# Patient Record
Sex: FEMALE | Race: WHITE
[De-identification: names, ages, dates, MRNs, and addresses within clinical notes are randomized per-mention and may not be internally consistent; named-entity substitution may affect disease eponyms.]

---

## 2017-08-26 ENCOUNTER — HOSPITAL ENCOUNTER (EMERGENCY)
Dept: HOSPITAL 17 - PHED | Age: 82
Discharge: HOME | End: 2017-08-26
Payer: MEDICARE

## 2017-08-26 VITALS — HEART RATE: 60 BPM | DIASTOLIC BLOOD PRESSURE: 86 MMHG | SYSTOLIC BLOOD PRESSURE: 183 MMHG

## 2017-08-26 VITALS — DIASTOLIC BLOOD PRESSURE: 77 MMHG | HEART RATE: 73 BPM | SYSTOLIC BLOOD PRESSURE: 197 MMHG | OXYGEN SATURATION: 96 %

## 2017-08-26 VITALS — WEIGHT: 252.87 LBS | HEIGHT: 64 IN | BODY MASS INDEX: 43.17 KG/M2

## 2017-08-26 VITALS
SYSTOLIC BLOOD PRESSURE: 188 MMHG | TEMPERATURE: 96 F | HEART RATE: 68 BPM | DIASTOLIC BLOOD PRESSURE: 81 MMHG | RESPIRATION RATE: 20 BRPM | OXYGEN SATURATION: 98 %
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
## 2017-08-26 DIAGNOSIS — T14.8: Primary | ICD-10-CM
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
## 2017-08-26 PROCEDURE — 72170 X-RAY EXAM OF PELVIS: CPT

## 2017-08-26 PROCEDURE — 99285 EMERGENCY DEPT VISIT HI MDM: CPT

## 2017-08-26 PROCEDURE — 70450 CT HEAD/BRAIN W/O DYE: CPT

## 2017-08-26 PROCEDURE — 72131 CT LUMBAR SPINE W/O DYE: CPT

## 2017-08-26 NOTE — RADRPT
EXAM DATE/TIME:  08/26/2017 19:40 

 

[facility] COMPARISON:     

No previous studies available for comparison.

 

 

INDICATIONS :     

Status post fall. Hit back of head.

                      

 

RADIATION DOSE:     

63.36 CTDIvol (mGy) 

 

 

 

MEDICAL HISTORY :     

Hypertension.  

 

SURGICAL HISTORY :       

cataract

 

ENCOUNTER:      

Initial

 

ACUITY:      

1 day

 

PAIN SCALE:      

0/10

 

LOCATION:       

Cranial 

 

TECHNIQUE:     

Multiple contiguous axial images were obtained of the head.  Using automated exposure control and adj
ustment of the mA and/or kV according to patient size, radiation dose was kept as low as reasonably a
chievable to obtain optimal diagnostic quality images.   DICOM format image data is available electro
nically for review and comparison.  

 

FINDINGS:     

There is central cerebral atrophy.  There is no acute infarct, acute hemorrhage, mass effect or extra
-axial fluid

 collections.  No skull fracture is noted.  

 

CONCLUSION:     

1.  Central cerebral atrophy.

2.  No acute infarct, acute hemorrhage, mass effect or extra-axial fluid collections.  

 

 

 Duke Sanches MD on August 26, 2017 at 19:49                

Board Certified Radiologist.

 This report was verified electronically.

## 2017-08-26 NOTE — PD
HPI


Chief Complaint:  Fall


Time Seen by Provider:  19:27


Travel History


International Travel<30 days:  No


Contact w/Intl Traveler<30days:  No


Traveled to known affect area:  No





History of Present Illness


HPI


The patient is an 85-year-old female that was cleaning the windows of a [employer] 

when she lost balance and fell.  There was no loss of consciousness, she did 

hit her mid occipital area of the scalp.  Her main complaint is pain in the low 

back, coccygeal area and bilateral pelvis.  She is able to walk around but with 

pain and the pain gradually increased to where she finally came in.  She did 

have some nausea following the fall but no vomiting.  She denies any numbness, 

weakness or radiation of pain down her legs.  She is on Coumadin for atrial 

fibrillation.  She denies any headache.





PFSH


Past Medical History


Hx Anticoagulant Therapy:  Yes (COUMADIN)


Arthritis:  Yes


Blood Disorders:  No


Heart Rhythm Problems:  Yes (A FIB)


Cancer:  No


Cardiovascular Problems:  Yes (htn on meds, a-fib)


Congestive Heart Failure:  Yes


Endocrine:  No


Genitourinary:  No


Hepatitis:  No


Hypertension:  Yes


Immune Disorder:  No


Kidney Stones:  No


Musculoskeletal:  No


Neurologic:  No


Psychiatric:  No


Reproductive:  No


Respiratory:  No


Immunizations Current:  Yes (SHINGLES: 2014)


Renal Failure:  No


Influenza Vaccination:  Yes


Pregnant?:  Not Pregnant


:  5


Para:  5





Past Surgical History


Abdominal Surgery:  Yes


AICD:  No


Appendectomy:  Yes


Arteriovenous Shunt:  No


Cardiac Surgery:  No


Cholecystectomy:  Yes


Ear Surgery:  No


Endocrine Surgery:  No


Eye Surgery:  Yes (CATARACTS, RETINAL TEAR REPAIRS BILATERAL)


Genitourinary Surgery:  No


Gynecologic Surgery:  Yes


Hysterectomy:  Yes


Insulin Pump:  No


Joint Replacement:  Yes (BILATERAL KNEES)


Oral Surgery:  No


Pacemaker:  No


Thoracic Surgery:  No


Other Surgery:  No





Social History


Alcohol Use:  No


Tobacco Use:  No (QUIT AGE 36)


Substance Use:  No





Allergies-Medications


(Allergen,Severity, Reaction):  


Coded Allergies:  


     adhesive (Unverified  Allergy, Intermediate, PT STATES NO ALLERGY TO THIS, 

17)


     latex (Unverified  Allergy, Intermediate, ITCHING/RASH, 17)


Reported Meds & Prescriptions





Reported Meds & Active Scripts


Active


Reported


Coumadin (Warfarin) 10 Mg Tab 8 Mg PO DAILY


Coumadin (Warfarin) 7.5 Mg Tab 7 Mg PO 


Meclizine (Meclizine HCl) 25 Mg Tab 25 Mg PO TID PRN


Coq-10 Tr (Coenzyme Q10 (Ubidecarenone)) 100 Mg Cap 200 Mg PO HS


Atenolol 50 Mg Tab 50 Mg PO DAILY








Review of Systems


Except as stated in HPI:  all other systems reviewed are Neg





Physical Exam


Narrative


GENERAL: The patient is obese, alert, oriented 3 and slight apparent distress 

with her low back pain.


SKIN: Focused skin assessment warm/dry.


HEAD: There is a contusion on the mid occipital area but no associated skull 

deformity.  Normocephalic.  Neither raccoon eyes nor wallace sign is present.


EYES: Pupils equal and round. No scleral icterus. No injection or drainage. 


ENT: No nasal bleeding or discharge.  Mucous membranes pink and moist.  There 

is no hemotympanum present.


NECK: Trachea midline. No JVD. 


CARDIOVASCULAR: Regular rate and rhythm.  No murmur appreciated.


RESPIRATORY: No accessory muscle use. Clear to auscultation. Breath sounds 

equal bilaterally. 


GASTROINTESTINAL: Abdomen soft, non-tender, nondistended. Hepatic and splenic 

margins not palpable. 


MUSCULOSKELETAL: No obvious deformities. No clubbing.  No cyanosis.  No edema.  

Straight leg raising is normal, deep tendon reflexes are 0 bilaterally both 

patella and Achilles and pinprick is normal.  There is tenderness diffusely in 

the low back and over the coccygeal area.  No obvious bony deformity is 

present.  There is diffuse tenderness over both sides of the pelvis laterally.


NEUROLOGICAL: Awake and alert. No obvious cranial nerve deficits.  Motor 

grossly within normal limits. Normal speech.


PSYCHIATRIC: Appropriate mood and affect; insight and judgment normal.





Data


Data


Last Documented VS





Vital Signs








  Date Time  Temp Pulse Resp B/P (MAP) Pulse Ox O2 Delivery O2 Flow Rate FiO2


 


17 20:41  60  183/86 (118)    


 


17 19:17     96 Room Air  


 


17 18:56 96.0  20     








Orders





 Orders


Ct Brain W/O Iv Contrast(Rout) (17 19:27)


Ct Lumb Spine W/O Contrast (17 19:27)


Pelvis, Ap Only (Routine) (17 19:30)








MDM


Medical Decision Making


Medical Screen Exam Complete:  Yes


Emergency Medical Condition:  Yes


Medical Record Reviewed:  Yes


Interpretation(s)


The CT brain shows central cerebral atrophy and no acute infarct, hemorrhage or 

mass effect noted.  The pelvis shows no acute fracture dislocation, mild 

degenerative changes involving the lumbar spine, bilateral hips and symphysis 

pubis are noted.The CT of the lumbar spine shows severe spinal stenosis and 

bilateral foraminal narrowing at L4 5 with moderate spinal stenosis and 

bilateral foraminal narrowing at L3-L4 and mild spinal stenosis with bilateral 

foraminal narrowing at L2-L3 and minimal disc bulge at L1-L2 and mild bilateral 

foraminal narrowing at L5-S1.  There is fitted set joint hypertrophy 

bilaterally L1 through L5 and grade 1 anterior listhesis of L4 on L5.  No 

fractures noted.


Differential Diagnosis


Compression fracture lumbar spine, fractured pelvis, fractured skull, 

intracranial bleed, scalp contusion, acute lumbar strain, multiple contusions


Narrative Course


The patient has significant foraminal narrowing of the lumbosacral spine on CT.

  She says she has a walker at home and can ambulate well with this.  She will 

follow up with her primary care physician next week.





Diagnosis





 Primary Impression:  


 Multiple contusions


 Additional Impression:  


 Foraminal stenosis of lumbar region





***Additional Instructions:  


As we discussed, follow-up with your primary care physician next week.  Use 

your walker at home for the next few days until these aches and pains subside.  

Do not drink alcohol or drive on the tramadol that I wrote.


***Med/Other Pt SpecificInfo:  Prescription(s) given


Scripts


Tramadol (Tramadol) 50 Mg Tab


50 MG PO Q6H Y for PAIN, #15 TAB 0 Refills


   Prov: Vern Ordonez MD         17


Disposition:  01 DISCHARGE HOME


Condition:  Stable











Vern Ordonez MD Aug 26, 2017 19:37

## 2017-08-26 NOTE — RADRPT
EXAM DATE/TIME:  08/26/2017 19:44 

 

[facility] COMPARISON:     

No previous studies available for comparison.

 

 

INDICATIONS :     

Patient fell and hurt tailbone.  Bilateral back and coccyx pain.

                      

 

RADIATION DOSE:     

40.36 CTDIvol (mGy) 

 

 

 

MEDICAL HISTORY :     

Congestive heart failure. Hypertension. 

 

SURGICAL HISTORY :      

Appendectomy. Cholecystectomy. Hysterectomy.

 

ENCOUNTER:      

Initial

 

ACUITY:      

1 day

 

PAIN SCALE:      

10/10

 

LOCATION:       

Bilateral buttock and lumbar

 

TECHNIQUE:     

Volumetric scanning of the lumbar spine was performed.  Multiplanar reconstructions in the sagittal, 
coronal and oblique axial planes were performed.  Using automated exposure control and adjustment of 
the mA and/or kV according to patient size, radiation dose was kept as low as reasonably achievable t
o obtain optimal diagnostic quality images.   DICOM format image data is available electronically for
 review and comparison.  

 

FINDINGS:       

There is grade I anterolisthesis of L4 in relation to L5.  Severe circumferential spinal stenosis and
 bilateral foraminal narrowing is noted at L4-L5.  There is no acute compression fracture of the lumb
ar spine.  Mild scoliosis of the lumbar spine is noted. 

 

T12-L1:  

There is no significant spinal stenosis, disc bulge or herniation.  The bilateral neural foramina are
 patent.  The facet joints and ligaments are unremarkable.

 

L1-L2:  

There is a minimal diffuse disc bulge as well as mild facet joint hypertrophy bilaterally resulting i
n no spinal stenosis or neural foraminal narrowing.  No focal disc herniation is noted. 

 

L2-L3:  

There is some mild spinal stenosis and bilateral foraminal narrowing secondary to a combination of di
ffuse disc bulge, facet joint hypertrophy and ligamentous laxity.  No focal disc herniation is noted.
 

 

L3-L4:  

There is moderate circumferential spinal stenosis and bilateral foraminal narrowing secondary to a co
mbination of diffuse disc bulge, facet joint hypertrophy and ligamentous laxity.  No focal disc herni
ation is noted.  

 

L4-L5:  

There is severe circumferential spinal stenosis and bilateral foraminal narrowing secondary to a comb
ination of diffuse disc bulge, facet joint hypertrophy and ligamentous laxity.  Vacuum facets are not
ed bilaterally.  There is grade I anterolisthesis of L4 in relation to L5.

 

L5-S1:  

There is no significant spinal stenosis.  Facet joint hypertrophy is noted bilaterally which results 
in mild bilateral foraminal narrowing.  No focal disc herniation is noted. 

 

CONCLUSION:     

1.  Severe spinal stenosis and bilateral foraminal narrowing at L4-L5. 

2.  Moderate spinal stenosis and bilateral foraminal narrowing at L3-L4.

3.  Mild spinal stenosis and bilateral foraminal narrowing at L2-L3. 

4.  Minimal diffuse disc bulge at L1-L2 resulting in no spinal stenosis or neural foraminal narrowing
.

5.  Mild bilateral foraminal narrowing at L5-S1 predominantly related to facet joint hypertrophy bila
terally.

6.  Facet joint hypertrophy bilaterally from L1 through S1. 

7.  Grade I anterolisthesis of L4 in relation to L5. 

 

 

 Duke Sanches MD on August 26, 2017 at 20:43                

Board Certified Radiologist.

 This report was verified electronically.

## 2017-08-26 NOTE — RADRPT
EXAM DATE/TIME:  08/26/2017 20:04 

 

[facility] COMPARISON:     

No previous studies available for comparison.

 

                     

INDICATIONS :     

Trauma. Fall.

                     

 

MEDICAL HISTORY :     

None.          

 

SURGICAL HISTORY :     

Hysterectomy. Cholecystectomy.  

 

ENCOUNTER:     

Initial                                        

 

ACUITY:     

1 day      

 

PAIN SCORE:     

8/10

 

LOCATION:     

Pelvis 

 

FINDINGS:     

There is no acute fracture or dislocation of the bony pelvis.  Mild degenerative changes are noted in
volving the lower lumbar spine and bilateral hips.  Mild degenerative changes are also noted involvin
g the symphysis pubis. 

 

CONCLUSION:     

1.  No acute fracture or dislocation.

2.  Mild degenerative changes involving the lumbar spine, bilateral hips and symphysis pubis.

 

 

 Duke Sanches MD on August 26, 2017 at 20:21                

Board Certified Radiologist.

 This report was verified electronically.

## 2018-01-24 ENCOUNTER — HOSPITAL ENCOUNTER (OUTPATIENT)
Dept: HOSPITAL 17 - PHED | Age: 83
Setting detail: OBSERVATION
LOS: 1 days | Discharge: HOME | End: 2018-01-25
Attending: FAMILY MEDICINE | Admitting: FAMILY MEDICINE
Payer: MEDICARE

## 2018-01-24 VITALS
TEMPERATURE: 97.5 F | SYSTOLIC BLOOD PRESSURE: 106 MMHG | OXYGEN SATURATION: 96 % | HEART RATE: 112 BPM | DIASTOLIC BLOOD PRESSURE: 80 MMHG | RESPIRATION RATE: 16 BRPM

## 2018-01-24 VITALS — BODY MASS INDEX: 43.51 KG/M2 | WEIGHT: 254.85 LBS | HEIGHT: 64 IN

## 2018-01-24 VITALS
DIASTOLIC BLOOD PRESSURE: 70 MMHG | HEART RATE: 94 BPM | RESPIRATION RATE: 16 BRPM | SYSTOLIC BLOOD PRESSURE: 129 MMHG | OXYGEN SATURATION: 100 %

## 2018-01-24 VITALS — SYSTOLIC BLOOD PRESSURE: 127 MMHG | DIASTOLIC BLOOD PRESSURE: 72 MMHG

## 2018-01-24 VITALS
SYSTOLIC BLOOD PRESSURE: 151 MMHG | TEMPERATURE: 96.6 F | RESPIRATION RATE: 20 BRPM | OXYGEN SATURATION: 97 % | DIASTOLIC BLOOD PRESSURE: 68 MMHG | HEART RATE: 72 BPM

## 2018-01-24 VITALS — HEART RATE: 87 BPM | DIASTOLIC BLOOD PRESSURE: 72 MMHG | SYSTOLIC BLOOD PRESSURE: 116 MMHG | RESPIRATION RATE: 16 BRPM

## 2018-01-24 VITALS — DIASTOLIC BLOOD PRESSURE: 75 MMHG | SYSTOLIC BLOOD PRESSURE: 102 MMHG | HEART RATE: 100 BPM | OXYGEN SATURATION: 97 %

## 2018-01-24 VITALS
HEART RATE: 87 BPM | RESPIRATION RATE: 16 BRPM | SYSTOLIC BLOOD PRESSURE: 162 MMHG | OXYGEN SATURATION: 98 % | DIASTOLIC BLOOD PRESSURE: 60 MMHG

## 2018-01-24 VITALS — HEART RATE: 67 BPM

## 2018-01-24 VITALS — OXYGEN SATURATION: 98 %

## 2018-01-24 DIAGNOSIS — I48.0: ICD-10-CM

## 2018-01-24 DIAGNOSIS — E78.5: ICD-10-CM

## 2018-01-24 DIAGNOSIS — N18.3: ICD-10-CM

## 2018-01-24 DIAGNOSIS — I50.32: ICD-10-CM

## 2018-01-24 DIAGNOSIS — R00.2: ICD-10-CM

## 2018-01-24 DIAGNOSIS — I13.0: Primary | ICD-10-CM

## 2018-01-24 LAB
ALBUMIN SERPL-MCNC: 3.5 GM/DL (ref 3.4–5)
ALP SERPL-CCNC: 60 U/L (ref 45–117)
ALT SERPL-CCNC: 23 U/L (ref 10–53)
AST SERPL-CCNC: 26 U/L (ref 15–37)
BASOPHILS # BLD AUTO: 0 TH/MM3 (ref 0–0.2)
BASOPHILS NFR BLD: 0.5 % (ref 0–2)
BILIRUB SERPL-MCNC: 0.3 MG/DL (ref 0.2–1)
BUN SERPL-MCNC: 30 MG/DL (ref 7–18)
CALCIUM SERPL-MCNC: 8.7 MG/DL (ref 8.5–10.1)
CHLORIDE SERPL-SCNC: 103 MEQ/L (ref 98–107)
CREAT SERPL-MCNC: 1.4 MG/DL (ref 0.5–1)
EOSINOPHIL # BLD: 0.1 TH/MM3 (ref 0–0.4)
EOSINOPHIL NFR BLD: 1.2 % (ref 0–4)
ERYTHROCYTE [DISTWIDTH] IN BLOOD BY AUTOMATED COUNT: 13 % (ref 11.6–17.2)
GFR SERPLBLD BASED ON 1.73 SQ M-ARVRAT: 36 ML/MIN (ref 89–?)
GLUCOSE SERPL-MCNC: 101 MG/DL (ref 74–106)
HCO3 BLD-SCNC: 30 MEQ/L (ref 21–32)
HCT VFR BLD CALC: 42.5 % (ref 35–46)
HGB BLD-MCNC: 13.9 GM/DL (ref 11.6–15.3)
INR PPP: 1.7 RATIO
LYMPHOCYTES # BLD AUTO: 1.7 TH/MM3 (ref 1–4.8)
LYMPHOCYTES NFR BLD AUTO: 29.1 % (ref 9–44)
MAGNESIUM SERPL-MCNC: 2 MG/DL (ref 1.5–2.5)
MCH RBC QN AUTO: 30.7 PG (ref 27–34)
MCHC RBC AUTO-ENTMCNC: 32.8 % (ref 32–36)
MCV RBC AUTO: 93.7 FL (ref 80–100)
MONOCYTE #: 0.5 TH/MM3 (ref 0–0.9)
MONOCYTES NFR BLD: 8.7 % (ref 0–8)
NEUTROPHILS # BLD AUTO: 3.4 TH/MM3 (ref 1.8–7.7)
NEUTROPHILS NFR BLD AUTO: 60.5 % (ref 16–70)
PLATELET # BLD: 158 TH/MM3 (ref 150–450)
PMV BLD AUTO: 8.7 FL (ref 7–11)
PROT SERPL-MCNC: 7.3 GM/DL (ref 6.4–8.2)
PROTHROMBIN TIME: 17.2 SEC (ref 9.8–11.6)
RBC # BLD AUTO: 4.53 MIL/MM3 (ref 4–5.3)
SODIUM SERPL-SCNC: 139 MEQ/L (ref 136–145)
TROPONIN I SERPL-MCNC: (no result) NG/ML (ref 0.02–0.05)
TROPONIN I SERPL-MCNC: (no result) NG/ML (ref 0.02–0.05)
WBC # BLD AUTO: 5.7 TH/MM3 (ref 4–11)

## 2018-01-24 PROCEDURE — A9502 TC99M TETROFOSMIN: HCPCS

## 2018-01-24 PROCEDURE — 78452 HT MUSCLE IMAGE SPECT MULT: CPT

## 2018-01-24 PROCEDURE — 84484 ASSAY OF TROPONIN QUANT: CPT

## 2018-01-24 PROCEDURE — 82550 ASSAY OF CK (CPK): CPT

## 2018-01-24 PROCEDURE — 93005 ELECTROCARDIOGRAM TRACING: CPT

## 2018-01-24 PROCEDURE — 80061 LIPID PANEL: CPT

## 2018-01-24 PROCEDURE — 80048 BASIC METABOLIC PNL TOTAL CA: CPT

## 2018-01-24 PROCEDURE — 83735 ASSAY OF MAGNESIUM: CPT

## 2018-01-24 PROCEDURE — 85025 COMPLETE CBC W/AUTO DIFF WBC: CPT

## 2018-01-24 PROCEDURE — 71275 CT ANGIOGRAPHY CHEST: CPT

## 2018-01-24 PROCEDURE — 96376 TX/PRO/DX INJ SAME DRUG ADON: CPT

## 2018-01-24 PROCEDURE — 99285 EMERGENCY DEPT VISIT HI MDM: CPT

## 2018-01-24 PROCEDURE — 96374 THER/PROPH/DIAG INJ IV PUSH: CPT

## 2018-01-24 PROCEDURE — G0378 HOSPITAL OBSERVATION PER HR: HCPCS

## 2018-01-24 PROCEDURE — 85730 THROMBOPLASTIN TIME PARTIAL: CPT

## 2018-01-24 PROCEDURE — 71045 X-RAY EXAM CHEST 1 VIEW: CPT

## 2018-01-24 PROCEDURE — 85610 PROTHROMBIN TIME: CPT

## 2018-01-24 PROCEDURE — 80053 COMPREHEN METABOLIC PANEL: CPT

## 2018-01-24 PROCEDURE — 93017 CV STRESS TEST TRACING ONLY: CPT

## 2018-01-24 RX ADMIN — ASPIRIN 81 MG SCH MG: 81 TABLET ORAL at 22:13

## 2018-01-24 NOTE — PD
HPI


Chief Complaint:  Cardiac Complaint


Time Seen by Provider:  17:52


Travel History


International Travel<30 days:  No


Contact w/Intl Traveler<30days:  No


Traveled to known affect area:  No





History of Present Illness


HPI


85-year-old female with history of A. fib on Coumadin, here for evaluation of 

elevated heart rate and chest heaviness.  The patient reports for the last 

couple of days she has felt some palpitations.  Today when she checked her 

blood pressure she noticed her heart rate was severely elevated.  She has been 

having intermittent episodes of substernal chest heaviness over the last couple 

of days which is worse with exertion.  She has history of CHF and is on Lasix.  

She reports that about 2 weeks ago she had a bilateral lower extremity duplex 

ordered by her primary care physician Dr. Mcguire and was reportedly negative.  

She denies history of DVT or PE.  States that her lower extremity edema is no 

worse than it usually is.  She gets slightly short of breath with exertion.  No 

orthopnea.  No shortest of breath at rest.  No cough.  No fevers, chills, or 

recent illness.  She does not see a cardiologist regularly.





PFSH


Past Medical History


Hx Anticoagulant Therapy:  Yes (COUMADIN)


Arthritis:  Yes


Blood Disorders:  No


Heart Rhythm Problems:  Yes (A FIB)


Cancer:  No


Cardiac Catheterization:  Yes (a fib)


Cardiovascular Problems:  Yes (A. FIB, HTN)


Congestive Heart Failure:  Yes


Diminished Hearing:  No


Endocrine:  No


Genitourinary:  No


Hepatitis:  No


Hypertension:  Yes


Immune Disorder:  No


Kidney Stones:  No


Musculoskeletal:  No


Neurologic:  No


Psychiatric:  No


Reproductive:  No


Respiratory:  No


Immunizations Current:  Yes (SHINGLES: 2014)


Renal Failure:  No


Tetanus Vaccination:  Unknown


Influenza Vaccination:  Yes


Pregnant?:  Not Pregnant


:  5


Para:  5





Past Surgical History


Abdominal Surgery:  Yes


AICD:  No


Appendectomy:  Yes


Arteriovenous Shunt:  No


Cardiac Surgery:  No


Cholecystectomy:  Yes


Ear Surgery:  No


Endocrine Surgery:  No


Eye Surgery:  Yes (CATARACTS, RETINAL TEAR REPAIRS BILATERAL)


Genitourinary Surgery:  No


Gynecologic Surgery:  Yes


Hysterectomy:  Yes


Insulin Pump:  No


Joint Replacement:  Yes (BILATERAL KNEES)


Oral Surgery:  No


Pacemaker:  No


Thoracic Surgery:  No


Other Surgery:  No





Social History


Alcohol Use:  No


Tobacco Use:  No (QUIT AGE 36)


Substance Use:  No





Allergies-Medications


(Allergen,Severity, Reaction):  


Coded Allergies:  


     adhesive (Unverified  Allergy, Intermediate, PT STATES NO ALLERGY TO THIS, 

18)


     latex (Unverified  Allergy, Intermediate, ITCHING/RASH, 18)


Reported Meds & Prescriptions





Reported Meds & Active Scripts


Active


Reported


Dyrenium (Triamterene) 50 Mg Cap 50 Mg PO DAILY


Coumadin (Warfarin) 10 Mg Tab 8 Mg PO DAILY


Coumadin (Warfarin) 7.5 Mg Tab 7 Mg PO 


Coq-10 Tr (Coenzyme Q10 (Ubidecarenone)) 100 Mg Cap 200 Mg PO HS


Atenolol 50 Mg Tab 50 Mg PO DAILY








Review of Systems


Except as stated in HPI:  all other systems reviewed are Neg





Physical Exam


Narrative


GENERAL: Well-developed, well-nourished, pleasant, comfortable, no apparent 

distress.


SKIN: Focused skin assessment warm/dry.


HEAD: Atraumatic. Normocephalic. 


EYES: Pupils equal and round. No scleral icterus. No injection or drainage. 


ENT: Mucous membranes pink and moist.


NECK: Trachea midline. No JVD. 


CARDIOVASCULAR: Irregularly irregular, rate 130s.


RESPIRATORY: No accessory muscle use. Clear to auscultation. Breath sounds 

equal bilaterally. 


GASTROINTESTINAL: Abdomen soft, non-tender, nondistended. 


MUSCULOSKELETAL: No obvious deformities. No clubbing.  No cyanosis.  

Significant bilateral lower extremity edema from foot to thigh.


NEUROLOGICAL: Awake and alert. No obvious cranial nerve deficits.  Motor 

grossly within normal limits. Normal speech.


PSYCHIATRIC: Appropriate mood and affect; insight and judgment normal.





Data


Data


Last Documented VS





Vital Signs








  Date Time  Temp Pulse Resp B/P (MAP) Pulse Ox O2 Delivery O2 Flow Rate FiO2


 


18 20:10  87 16 116/72 (87)  Room Air  


 


18 19:05     98  2.00 


 


18 17:24 97.5       








Orders





 Orders


Electrocardiogram (18 17:59)


Ckmb (Isoenzyme) Profile (18 17:59)


Complete Blood Count With Diff (18 17:59)


Comprehensive Metabolic Panel (18 17:59)


Magnesium (Mg) (18 17:59)


Prothrombin Time / Inr (Pt) (18 17:59)


Act Partial Throm Time (Ptt) (18 17:59)


Troponin I (18 17:59)


Chest, Single Ap (18 17:59)


Ecg Monitoring (18 17:59)


Bilateral Bp Monitoring (18 17:59)


Iv Access Insert/Monitor (18 17:59)


Oximetry (18 17:59)


Oxygen Administration (18 17:59)


Sodium Chloride 0.9% Flush (Ns Flush) (18 18:00)


Diltiazem Inj (Cardizem Inj) (18 18:00)


Ct Pulmonary Angiogram (18 )


Iodixanol 320 Inj (Rad Ct) (Visipaque 32 (18 20:20)


Diltiazem Inj (Cardizem Inj) (18 21:00)


Diltiazem (Cardizem) (18 21:00)


Admit Order (Ed Use Only) (18 20:55)





Labs





Laboratory Tests








Test


  18


17:47


 


White Blood Count 5.7 TH/MM3 


 


Red Blood Count 4.53 MIL/MM3 


 


Hemoglobin 13.9 GM/DL 


 


Hematocrit 42.5 % 


 


Mean Corpuscular Volume 93.7 FL 


 


Mean Corpuscular Hemoglobin 30.7 PG 


 


Mean Corpuscular Hemoglobin


Concent 32.8 % 


 


 


Red Cell Distribution Width 13.0 % 


 


Platelet Count 158 TH/MM3 


 


Mean Platelet Volume 8.7 FL 


 


Neutrophils (%) (Auto) 60.5 % 


 


Lymphocytes (%) (Auto) 29.1 % 


 


Monocytes (%) (Auto) 8.7 % 


 


Eosinophils (%) (Auto) 1.2 % 


 


Basophils (%) (Auto) 0.5 % 


 


Neutrophils # (Auto) 3.4 TH/MM3 


 


Lymphocytes # (Auto) 1.7 TH/MM3 


 


Monocytes # (Auto) 0.5 TH/MM3 


 


Eosinophils # (Auto) 0.1 TH/MM3 


 


Basophils # (Auto) 0.0 TH/MM3 


 


CBC Comment DIFF FINAL 


 


Differential Comment  


 


Prothrombin Time 17.2 SEC 


 


Prothromb Time International


Ratio 1.7 RATIO 


 


 


Activated Partial


Thromboplast Time 29.9 SEC 


 


 


Blood Urea Nitrogen 30 MG/DL 


 


Creatinine 1.40 MG/DL 


 


Random Glucose 101 MG/DL 


 


Total Protein 7.3 GM/DL 


 


Albumin 3.5 GM/DL 


 


Calcium Level 8.7 MG/DL 


 


Magnesium Level 2.0 MG/DL 


 


Alkaline Phosphatase 60 U/L 


 


Aspartate Amino Transf


(AST/SGOT) 26 U/L 


 


 


Alanine Aminotransferase


(ALT/SGPT) 23 U/L 


 


 


Total Bilirubin 0.3 MG/DL 


 


Sodium Level 139 MEQ/L 


 


Potassium Level 4.0 MEQ/L 


 


Chloride Level 103 MEQ/L 


 


Carbon Dioxide Level 30.0 MEQ/L 


 


Anion Gap 6 MEQ/L 


 


Estimat Glomerular Filtration


Rate 36 ML/MIN 


 


 


Total Creatine Kinase 81 U/L 


 


Troponin I


  LESS THAN 0.02


NG/ML











[facility]


Medical Decision Making


Medical Screen Exam Complete:  Yes


Emergency Medical Condition:  Yes


Medical Record Reviewed:  Yes


Interpretation(s)


EKG: A. fib, rate 125, normal axis, normal intervals, moderate ST depressions, 

no ST segment elevations.


Differential Diagnosis


A. fib with RVR, ACS, CHF, PE, metabolic abnormality


Narrative Course


Initial vital signs show heart rate 112, blood pressure 106/80, pulse ox 96% on 

room air, oral temp of 97.5F.





CBC is unremarkable.


CMP is remarkable for BUN 30, creatinine 1.4, GFR 36 which is slightly worse 

than her baseline.


Cardiac enzymes are negative.


INR is subtherapeutic at 1.7.





Chest x-ray:


CONCLUSION:     


1. Mild cardiomegaly. Minimal basilar atelectasis.





Patient was made aware of all laboratory findings.  She tells me that for the 

last week she has been having a sharp pain in her right posterior back which is 

worsened with deep inspiration.  CT pulmonary angiogram ordered to rule out a 

PE.





CT pulmonary angiogram:


CONCLUSION:     


1. Negative for pulmonary embolus. Minimal dependent atelectasis in the lungs. 

Mild cardiomegaly.





Patient was made aware of all findings.  She was given 10 mg of IV Cardizem 

shortly after arriving to the emergency department for heart rate in the 120s 

to 130s.  Her heart rate initially improved to 80s to 90s.  On reassessment a 

few hours later her heart rate returned to 110-120.  She was given another 10 

mg of IV Cardizem.  Given uncontrolled rate of A. fib with RVR, the patient 

will be admitted for further treatment and evaluation.





Case discussed with [facility] hospitalist Dr holt who has agreed to admit the 

patient to his service.





Diagnosis





 Primary Impression:  


 Atrial fibrillation with RVR


 Additional Impression:  


 Chest pain











Eldon Barry MD 2018 18:03

## 2018-01-24 NOTE — RADRPT
EXAM DATE/TIME:  01/24/2018 20:13 

 

[facility] COMPARISON:     

No previous studies available for comparison.

 

 

INDICATIONS :     

Tachycardia.

                      

 

IV CONTRAST:     

50 cc Visipaque (iodixanol) IV 

 

 

RADIATION DOSE:     

12.64 CTDIvol (mGy) 

 

 

MEDICAL HISTORY :     

Cardiovascular disease. Congestive heart failure. Hypertension.

 

SURGICAL HISTORY :      

None. 

 

ENCOUNTER:      

Initial

 

ACUITY:      

1 day

 

PAIN SCALE:      

0/10

 

LOCATION:        

chest 

 

TECHNIQUE:     

Volumetric scanning of the chest was performed using a pulmonary embolism protocol MIP images were re
constructed.  Using automated exposure control and adjustment of the mA and/or kV according to patien
t size, radiation dose was kept as low as reasonably achievable to obtain optimal diagnostic quality 
images.   DICOM format image data is available electronically for review and comparison.  

 

Follow-up recommendations for detected pulmonary nodules are based at a minimum on nodule size and pa
tient risk factors according to Fleischner Society Guidelines.

 

FINDINGS:     

No filling defects to suggest pulmonary embolus. No pleural or pericardial effusion. Mild basilar ate
lectasis. No adenopathy. No pleural or pericardial effusion. No acute findings in the upper abdomen. 
Mild coronary calcifications.

 

CONCLUSION:     

1. Negative for pulmonary embolus. Minimal dependent atelectasis in the lungs. Mild cardiomegaly.

 

 

 

 

 Grabiel Pennington MD on January 24, 2018 at 20:31           

Board Certified Radiologist.

 This report was verified electronically.

## 2018-01-24 NOTE — RADRPT
EXAM DATE/TIME:  01/24/2018 18:31 

 

[facility] COMPARISON:     

No previous studies available for comparison.

 

                     

INDICATIONS :     

Chest pain.

                     

 

MEDICAL HISTORY :            

Congestive heart failure. Hypertension.   

 

SURGICAL HISTORY :     

Hysterectomy. Appendectomy. Cholecystectomy. 

 

ENCOUNTER:     

Initial                                        

 

ACUITY:     

1 day      

 

PAIN SCORE:     

5/10

 

LOCATION:     

Bilateral chest 

 

FINDINGS:     

A single view of the chest demonstrates a mild cardiomegaly No focal infiltrate or effusion. No pneum
othorax.

 

CONCLUSION:     

1. Mild cardiomegaly. Minimal basilar atelectasis.

 

 

 

 Grabiel Pennington MD on January 24, 2018 at 19:35           

Board Certified Radiologist.

 This report was verified electronically.

## 2018-01-24 NOTE — HHI.HP
HPI


Service


[facility]


Primary Care Physician


Primitivo Mcguire MD


Admission Diagnosis





A. fib with RVR, chest pain


Chief Complaint:  


chest discomfort/heaviness, Afib with elevated heart rate


Travel History


International Travel<30 Days:  No


Contact w/Intl Traveler <30 Da:  No


Traveled to Known Affected Are:  No


History of Present Illness


85-year-old female with history of A. fib on Coumadin, here for evaluation of 

elevated heart rate and chest heaviness.  The patient reports for the last 

couple of days she has felt some palpitations.  Today when she checked her 

blood pressure with home monitor she noticed her heart rate was severely 

elevated and was accompanied by chest fullness/pressure.  She has been having 

intermittent episodes of substernal chest heaviness over the last couple of 

days which is worse with exertion.  She has history of diastolic CHF and is on 

intermittent Lasix dosing as needed for edema.  She reports that about 2 weeks 

ago she had a bilateral lower extremity duplex ordered by her primary care 

physician Dr. Mcguire and was reportedly negative.  She denies history of DVT or 

PE.  States that her lower extremity edema is no worse than it usually is.  She 

gets slightly short of breath with exertion which is actually chronic for her.  

No orthopnea.  No shortest of breath at rest.  No cough.  No fevers, chills, or 

recent illness.  She does not see a cardiologist regularly. She presented to ER 

since she had never had the accompanying chest pressure with her Afib bouts in 

the past. Heart rate was up into 120-130s at times and she responded well to IV 

cardizem followed by oral IR cardizem last night. No more CP. Plan for stress 

test and possible d/c home later today depending on results.





Review of Systems


Constitutional:  COMPLAINS OF: Fatigue, DENIES: Diaphoretic episodes, Fever, 

Weight gain, Weight loss, Chills, Dizziness, Change in appetite, Night Sweats


Eyes:  DENIES: Blurred vision, Diplopia, Eye inflammation, Eye pain, Vision loss

, Photosensitivity, Double Vision


Ears, nose, mouth, throat:  DENIES: Tinnitus, Hearing loss, Vertigo, Nasal 

discharge, Oral lesions, Throat pain, Hoarseness, Ear Pain, Running Nose, 

Epistaxis, Sinus Pain, Toothache, Odynophagia


Respiratory:  COMPLAINS OF: Shortness of breath, DENIES: Apneas, Cough, Snoring

, Wheezing, Hemoptysis, Sputum production


Cardiovascular:  COMPLAINS OF: Chest pain, Palpitations, Dyspnea on Exertion, 

Lower Extremity Edema, DENIES: Syncope, PND, Orthopnea, Claudication


Gastrointestinal:  DENIES: Abdominal pain, Black stools, Bloody stools, BRB per 

rectum, Constipation, Diarrhea, GERD, Nausea, Reflux, Vomiting, Difficulty 

Swallowing, Anorexia, See HPI


Musculoskeletal:  COMPLAINS OF: Muscle aches, Back pain, DENIES: Joint pain, 

Stiffness, Joint Swelling, Neck pain


Hematologic/lymphatic:  DENIES: Bruising, Lymphadenopathy


Immunologic/allergic:  DENIES: Eczema, Urticaria


Neurologic:  DENIES: Abnormal gait, Headache, Localized weakness, Paresthesias, 

Seizures, Speech Problems, Tremor, Poor Balance


Psychiatric:  DENIES: Anxiety, Confusion, Mood changes, Depression, 

Hallucinations, Agitation, Suicidal Ideation, Homicidal Ideation, Delusions, 

History of Bipolar, History of Schizophrenia





Past Family Social History


Past Medical History


Atherosclerosis of the aorta


Cervical degenerative disc disease


CK D stage III


Diastolic congestive heart failure


Morbid obesity


Hypertension


Hyperlipidemia


Paroxysmal atrial fibrillation


Vitamin D deficiency


Past Surgical History


Appendectomy


Left knee arthrosis plasty of the femoral condyles in 


Cataract surgery


Cholecystectomy 1981


Oophorectomy and unilateral salpingo-oophorectomy


Subsequent to this total abdominal hysterectomy in  with cervix removal


Total knee arthroplasty on the right in 


Reported Medications


Coumadin (Warfarin)  8 Mg PO DAILY on MON, WED, FRI, SAT, SUN


Coumadin (Warfarin) 7.5 Mg Tab 7 Mg PO 


Coq-10 Tr (Coenzyme Q10 (Ubidecarenone)) 100 Mg Cap 200 Mg PO HS


Atenolol 50 Mg Tab 50 Mg PO DAILY


Dyazide 27.5/25 one daily


Allergies:  


Coded Allergies:  


     adhesive (Unverified  Allergy, Intermediate, PT STATES NO ALLERGY TO THIS, 

18)


     latex (Unverified  Allergy, Intermediate, ITCHING/RASH, 18)


Family History


Several family members had coronary artery disease and her mother had 

congestive heart failure


Father had lung cancer


Essentially noncontributory at this point


Social History


Patient smoked about 7-10 cigarettes a day in her early 20s but quit at age 28, 

no smoking since then





Somewhat physically inactive, sedentary lifestyle


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





Physical Exam


Vital Signs





Vital Signs








  Date Time  Temp Pulse Resp B/P (MAP) Pulse Ox O2 Delivery O2 Flow Rate FiO2


 


1/24/18 20:10  87 16 116/72 (87)  Room Air  


 


18 19:05  87 16 162/60 (94) 98 Nasal Cannula 2.00 


 


18 19:05     98 Nasal Cannula 2.00 


 


18 18:32  100  102/75 (84) 97   


 


18 18:31     98   


 


18 17:47  140      


 


18 17:24 97.5 112 16 106/80 (89) 96   








Physical Exam


GENERAL: This is a well-nourished, obese, well-developed patient, in no 

apparent distress.  Lying flat in bed with no noted increased work of breathing.


SKIN: No rashes, ecchymoses or lesions with the exception of venous stasis 

dermatitis bilateral lower extremities. Cool and dry.


HEAD: Atraumatic. Normocephalic. No temporal or scalp tenderness.


EYES: Pupils equal round and reactive. Extraocular motions intact. No scleral 

icterus. No injection or drainage. 


ENT: Nose without bleeding, purulent drainage or septal hematoma. Airway patent.


NECK: Trachea midline. No JVD or lymphadenopathy. Supple, nontender, no 

meningeal signs.


CARDIOVASCULAR: Irregularly irregular rhythm without murmurs, gallops, or rubs.

  Rate in 60s to 70s on my exam this morning.


RESPIRATORY: Clear to auscultation. Breath sounds equal bilaterally. No wheezes

, rales, or rhonchi.  


GASTROINTESTINAL: Abdomen soft, non-tender, nondistended. No hepato-splenomegaly

, or palpable masses. No guarding.


MUSCULOSKELETAL: Extremities without clubbing, cyanosis, or edema. No joint 

tenderness, effusion, or edema noted.  Slight calf tenderness to palpation 

bilaterally which she reports is chronic.  No cords palpable.


NEUROLOGICAL: Awake and alert. Cranial nerves II through XII intact.  Motor and 

sensory grossly within normal limits. Five out of 5 muscle strength in all 

muscle groups.  Normal speech.


Laboratory





Laboratory Tests








Test


  18


17:47


 


White Blood Count 5.7 


 


Red Blood Count 4.53 


 


Hemoglobin 13.9 


 


Hematocrit 42.5 


 


Mean Corpuscular Volume 93.7 


 


Mean Corpuscular Hemoglobin 30.7 


 


Mean Corpuscular Hemoglobin


Concent 32.8 


 


 


Red Cell Distribution Width 13.0 


 


Platelet Count 158 


 


Mean Platelet Volume 8.7 


 


Neutrophils (%) (Auto) 60.5 


 


Lymphocytes (%) (Auto) 29.1 


 


Monocytes (%) (Auto) 8.7 


 


Eosinophils (%) (Auto) 1.2 


 


Basophils (%) (Auto) 0.5 


 


Neutrophils # (Auto) 3.4 


 


Lymphocytes # (Auto) 1.7 


 


Monocytes # (Auto) 0.5 


 


Eosinophils # (Auto) 0.1 


 


Basophils # (Auto) 0.0 


 


CBC Comment DIFF FINAL 


 


Differential Comment  


 


Prothrombin Time 17.2 


 


Prothromb Time International


Ratio 1.7 


 


 


Activated Partial


Thromboplast Time 29.9 


 


 


Blood Urea Nitrogen 30 


 


Creatinine 1.40 


 


Random Glucose 101 


 


Total Protein 7.3 


 


Albumin 3.5 


 


Calcium Level 8.7 


 


Magnesium Level 2.0 


 


Alkaline Phosphatase 60 


 


Aspartate Amino Transf


(AST/SGOT) 26 


 


 


Alanine Aminotransferase


(ALT/SGPT) 23 


 


 


Total Bilirubin 0.3 


 


Sodium Level 139 


 


Potassium Level 4.0 


 


Chloride Level 103 


 


Carbon Dioxide Level 30.0 


 


Anion Gap 6 


 


Estimat Glomerular Filtration


Rate 36 


 


 


Total Creatine Kinase 81 


 


Troponin I LESS THAN 0.02 








Result Diagram:  


18








Caprini VTE Risk Assessment


Caprini VTE Risk Assessment:  Mod/High Risk (score >= 2)


Caprini Risk Assessment Model











 Point Value = 1          Point Value = 2  Point Value = 3  Point Value = 5


 


Age 41-60


Minor surgery


BMI > 25 kg/m2


Swollen legs


Varicose veins


Pregnancy or postpartum


History of unexplained or recurrent


   spontaneous 


Oral contraceptives or hormone


   replacement


Sepsis (< 1 month)


Serious lung disease, including


   pneumonia (< 1 month)


Abnormal pulmonary function


Acute myocardial infarction


Congestive heart failure (< 1 month)


History of inflammatory bowel disease


Medical patient at bed rest Age 61-74


Arthroscopic surgery


Major open surgery (> 45 min)


Laparoscopic surgery (> 45 min)


Malignancy


Confined to bed (> 72 hours)


Immobilizing plaster cast


Central venous access Age >= 75


History of VTE


Family history of VTE


Factor V Leiden


Prothrombin 46569W


Lupus anticoagulant


Anticardiolipin antibodies


Elevated serum homocysteine


Heparin-induced thrombocytopenia


Other congenital or acquired


   thrombophilia Stroke (< 1 month)


Elective arthroplasty


Hip, pelvis, or leg fracture


Acute spinal cord injury (< 1 month)








Prophylaxis Regimen











   Total Risk


Factor Score Risk Level Prophylaxis Regimen


 


0-1      Low Early ambulation


 


2 Moderate Order ONE of the following:


*Sequential Compression Device (SCD)


*Heparin 5000 units SQ BID


 


3-4 Higher Order ONE of the following medications:


*Heparin 5000 units SQ TID


*Enoxaparin/Lovenox 40 mg SQ daily (WT < 150 kg, CrCl > 30 mL/min)


*Enoxaparin/Lovenox 30 mg SQ daily (WT < 150 kg, CrCl > 10-29 mL/min)


*Enoxaparin/Lovenox 30 mg SQ BID (WT < 150 kg, CrCl > 30 mL/min)


AND/OR


*Sequential Compression Device (SCD)


 


5 or more Highest Order ONE of the following medications:


*Heparin 5000 units SQ TID (Preferred with Epidurals)


*Enoxaparin/Lovenox 40 mg SQ daily (WT < 150 kg, CrCl > 30 mL/min)


*Enoxaparin/Lovenox 30 mg SQ daily (WT < 150 kg, CrCl > 10-29 mL/min)


*Enoxaparin/Lovenox 30 mg SQ BID (WT < 150 kg, CrCl > 30 mL/min)


AND


*Sequential Compression Device (SCD)











Assessment and Plan


Problem List:  


(1) Chest pain


ICD Codes:  R07.9 - Chest pain, unspecified


Status:  Acute


Plan:  Certainly has risk factors for coronary disease given her age, 

hypertension and reported hyperlipidemia.


Do not see an outpatient stress test done.  Acute injury has been ruled out.  

We will provide stress test.


We'll cancel cardiology consult.  I discussed the case with Dr. Kirk Garcia 

and he was in agreement with current plan of care.


Hopefully discharge home later today depending on stress test.





(2) Atrial fibrillation with RVR


ICD Codes:  I48.91 - Unspecified atrial fibrillation


Status:  Acute


Plan:  She seems to respond to IV Cardizem.  We'll try oral Cardizem and oral 

metoprolol.


INR a bit subtherapeutic.  We will increase her warfarin to 8 mg daily.





(3) Hypertension


ICD Codes:  I10 - Essential (primary) hypertension


Plan:  Chronic.  We'll provide medication as tolerated.





(4) CKD (chronic kidney disease), stage III


ICD Codes:  N18.3 - Chronic kidney disease, stage 3 (moderate)


Status:  Chronic


Plan:  Monitor GFR.  Stable.





(5) Diastolic CHF


ICD Codes:  I50.30 - Unspecified diastolic (congestive) heart failure


Status:  Chronic


Plan:  No overt failure evident.  Last echocardiogram on record from May 2017 

and reveals an EF of around 55-60% with mildly dilated left atrium.  Also noted 

mild to moderate mitral valve regurgitation and estimated pulmonary pressure 

was 39 mm which is borderline high.  We'll not repeat echo at this point given 

lack of symptomatology of overt CHF.





(6) Hyperlipidemia


ICD Codes:  E78.5 - Hyperlipidemia, unspecified


Status:  Chronic


Plan:  Patient does not appear to be on any lipid-lowering medication.  LDL was 

148 in 2017.


A repeat cholesterol panel has been ordered and will discuss possible 

initiation of medication.





Code Status


full


Discussed Condition With


Patient and ER provider.





Problem Qualifiers





(1) Chest pain:  





(2) Hypertension:  


Qualified Codes:  I10 - Essential (primary) hypertension








Mark Pacheco MD PhD 2018 21:45

## 2018-01-25 VITALS
HEART RATE: 76 BPM | TEMPERATURE: 97.6 F | OXYGEN SATURATION: 96 % | DIASTOLIC BLOOD PRESSURE: 53 MMHG | SYSTOLIC BLOOD PRESSURE: 109 MMHG | RESPIRATION RATE: 16 BRPM

## 2018-01-25 VITALS — SYSTOLIC BLOOD PRESSURE: 122 MMHG | RESPIRATION RATE: 18 BRPM | HEART RATE: 63 BPM | DIASTOLIC BLOOD PRESSURE: 64 MMHG

## 2018-01-25 VITALS — OXYGEN SATURATION: 94 %

## 2018-01-25 VITALS
HEART RATE: 67 BPM | TEMPERATURE: 96.2 F | RESPIRATION RATE: 12 BRPM | OXYGEN SATURATION: 97 % | DIASTOLIC BLOOD PRESSURE: 69 MMHG | SYSTOLIC BLOOD PRESSURE: 176 MMHG

## 2018-01-25 VITALS — HEART RATE: 66 BPM

## 2018-01-25 VITALS
HEART RATE: 65 BPM | SYSTOLIC BLOOD PRESSURE: 142 MMHG | RESPIRATION RATE: 12 BRPM | OXYGEN SATURATION: 97 % | TEMPERATURE: 96.5 F | DIASTOLIC BLOOD PRESSURE: 61 MMHG

## 2018-01-25 VITALS — OXYGEN SATURATION: 96 %

## 2018-01-25 LAB
BUN SERPL-MCNC: 25 MG/DL (ref 7–18)
CALCIUM SERPL-MCNC: 8.8 MG/DL (ref 8.5–10.1)
CHLORIDE SERPL-SCNC: 104 MEQ/L (ref 98–107)
CHOLEST SERPL-MCNC: 198 MG/DL (ref 120–200)
CHOLESTEROL/ HDL RATIO: 4.47 RATIO
CREAT SERPL-MCNC: 0.95 MG/DL (ref 0.5–1)
GFR SERPLBLD BASED ON 1.73 SQ M-ARVRAT: 56 ML/MIN (ref 89–?)
GLUCOSE SERPL-MCNC: 91 MG/DL (ref 74–106)
HCO3 BLD-SCNC: 29.6 MEQ/L (ref 21–32)
HDLC SERPL-MCNC: 44.2 MG/DL (ref 40–60)
INR PPP: 1.4 RATIO
LDLC SERPL-MCNC: 131 MG/DL (ref 0–99)
PROTHROMBIN TIME: 14.2 SEC (ref 9.8–11.6)
SODIUM SERPL-SCNC: 140 MEQ/L (ref 136–145)
TRIGL SERPL-MCNC: 115 MG/DL (ref 42–150)
TROPONIN I SERPL-MCNC: (no result) NG/ML (ref 0.02–0.05)

## 2018-01-25 RX ADMIN — ASPIRIN 81 MG SCH MG: 81 TABLET ORAL at 08:38

## 2018-01-25 NOTE — RADRPT
EXAM DATE/TIME:  01/25/2018 13:31 

 

[facility] COMPARISON:     

No previous studies available for comparison.

 

 

INDICATIONS :     

Chest pain.   

                           

 

DOSE:     

30.1 mCi Tc99m Myoview at stress.

                     10.8 mCi Tc99m Myoview at rest.

                     0.4 mg Lexiscan

                       

 

 

STRESS SYMPTOMS:      

Shortness of breath. 

                       

 

 

EJECTION FRACTION:       

70%

                       

 

MEDICAL HISTORY :     

Hypertension.   

 

SURGICAL HISTORY :      

Appendectomy. Cholecystectomy. Hysterectomy. 

 

ENCOUNTER:     

Initial

 

ACUITY:     

1 day

 

PAIN SCALE:     

3/10

 

LOCATION:      

Bilateral chest 

 

TECHNIQUE:     

The patient underwent pharmacologic stress with infusion of prescribed dose.  Continuous ECG tracing 
was monitored during stress.  Gated SPECT imaging was performed after stress and conventional SPECT i
maging was performed at rest.  The examination was performed on a SPECT/CT scanner, both attenuation 
and non-corrected datasets were reviewed.

 

FINDINGS:     

 

DISTRIBUTION:     

The maximum perfused segment at stress is in the anterior wall.

 

PERFUSION STUDY:     

The pattern of perfusion at stress is within normal limits.

 

GATED STUDY:     

There is intact wall motion and thickening without hypokinetic or dyskinetic segments. 

 

CONCLUSION:     

No focal wall motion abnormalities. No reversible perfusion defects.

 

RISK CATEGORY:     1- Low Risk.

 

 

 

 

 Chacorta Staley MD on January 25, 2018 at 15:32           

Board Certified Radiologist.

 This report was verified electronically.

## 2018-01-25 NOTE — EKG
Date Performed: 01/25/2018       Time Performed: 02:44:05

 

PTAGE:      85 years

 

EKG:      Sinus rhythm 

 

 NORMAL ECG

 

PREVIOUS TRACING       : 01/24/2018 21.49 Compared to prior tracing,  now in normal sinus rhythm

 

DOCTOR:   Silverio Sánchez  Interpretating Date/Time  01/25/2018 23:31:56

## 2018-01-25 NOTE — EKG
Date Performed: 01/24/2018       Time Performed: 18:04:17

 

PTAGE:      85 years

 

EKG:      ATRIAL FIBRILLATION WITH RAPID VENTRICULAR RESPONSE LOW QRS VOLTAGE IN PRECORDIAL LEADS MOD
ERATE ST DEPRESSION ABNORMAL ECG

 

PREVIOUS TRACING       : 03/13/2008 12.37 Compared to prior tracing,  now in AFib with RVR with some 
ST/T wave changes

 

DOCTOR:   Silverio Sánchez  Interpretating Date/Time  01/25/2018 00:30:45

## 2018-01-25 NOTE — EKG
Date Performed: 01/24/2018       Time Performed: 21:49:30

 

PTAGE:      85 years

 

EKG:      ATRIAL FIBRILLATION INTRAVENTRICULAR CONDUCTION DELAY NON-SPECIFIC ST/T WAVE CHANGES ABNORM
AL ECG

 

PREVIOUS TRACING       : 01/24/2018 18.04 Since the prior tracing, there has been no significant alonzo


 

DOCTOR:   Silverio Sánchez  Interpretating Date/Time  01/25/2018 00:19:27